# Patient Record
(demographics unavailable — no encounter records)

---

## 2024-10-23 NOTE — HISTORY OF PRESENT ILLNESS
[Formula ___ oz/feed] : [unfilled] oz of formula per feed [Normal] : Normal [In Bassinet/Crib] : sleeps in bassinet/crib [On back] : sleeps on back [Loose bedding, pillow, toys, and/or bumpers in crib] : no loose bedding, pillow, toys, and/or bumpers in crib [de-identified] : kendamil 4oz every 2 hours, one 4 hour stretch overnight [FreeTextEntry8] : stools soft, watery every other day

## 2024-10-23 NOTE — HISTORY OF PRESENT ILLNESS
[Formula ___ oz/feed] : [unfilled] oz of formula per feed [Normal] : Normal [In Bassinet/Crib] : sleeps in bassinet/crib [On back] : sleeps on back [Loose bedding, pillow, toys, and/or bumpers in crib] : no loose bedding, pillow, toys, and/or bumpers in crib [de-identified] : kendamil 4oz every 2 hours, one 4 hour stretch overnight [FreeTextEntry8] : stools soft, watery every other day

## 2024-10-23 NOTE — HISTORY OF PRESENT ILLNESS
[PCV 20] : PCV 20 [DTaP/IPV/Hib/HepB] : DTaP/IPV/Hib/HepB [Rotavirus] : Rotavirus [FreeTextEntry1] : L. thigh: Sharon NOVA thigh: Prevnar 20    Oral: Rotateq     Pt tolerated well.

## 2024-10-23 NOTE — DEVELOPMENTAL MILESTONES
[Smiles responsively] : smiles responsively [Vocalizes with simple cooing] : vocalizes with simple cooing [Lifts head and chest in prone] : lifts head and chest in prone [Opens and shuts hands] : opens and shuts hands [Passed] : passed [Normal Development] : Normal Development [FreeTextEntry1] :  Social Determinants of Health domains were screened and scored.  [FreeTextEntry2] : 2

## 2024-12-20 NOTE — HISTORY OF PRESENT ILLNESS
[de-identified] : cough [FreeTextEntry6] :   has had a mucousy cough for 2 days, noted he seem to "struggle more to breath last night" denies and fever, V/D Brother had a stomach virus last week, no URI Feeding a little less but making regular wet diapers

## 2024-12-20 NOTE — DISCUSSION/SUMMARY
[FreeTextEntry1] :   Jeevan is a beautiful 3 month old infant boy presenting for cough  slight upper airway sounds but lungs clear, no inc wob, no coughing noted during exam  Supportive Care  -nasal saline and aspirator for nose (if age appropriate) or frequent nose blowing -Suction before feedings and bedtime (if age appropriate) -Humidifier -Can sit in steamy bathroom for 10 minutes at a time -ED precautions for resp distress or inc wob, high fevers not responsive to fever reducing medication, lethargy, poor intake/UOP -RTO if new or worsening symptoms

## 2024-12-20 NOTE — PHYSICAL EXAM
[Transmitted Upper Airway Sounds] : transmitted upper airway sounds [NL] : warm, clear [Crackles] : no crackles [Rhonchi] : no rhonchi [Belly Breathing] : no belly breathing [FreeTextEntry1] : alert happy and interactive [FreeTextEntry7] : sat 100%, NO inc wob

## 2025-01-02 NOTE — HISTORY OF PRESENT ILLNESS
[Mother] : mother [Breast milk] : breast milk [Formula ___ oz/feed] : [unfilled] oz of formula per feed [Normal] : Normal [In Bassinet/Crib] : sleeps in bassinet/crib [Co-sleeping] : co-sleeping [Sleeps 12-16 hours per 24 hours (including naps)] : sleeps 12-16 hours per 24 hours (including naps) [Tummy time] : tummy time [Rear facing car seat in back seat] : Rear facing car seat in back seat [de-identified] : mostly formula during the day, breast milk over night. kendamil 4-5oz every 3 hours.   [FreeTextEntry3] : wakes twice overnight for feeds [FreeTextEntry1] : ongoing concerns about feet being turned in.  he is otherwise doing well.

## 2025-01-02 NOTE — PHYSICAL EXAM
[Alert] : alert [Normocephalic] : normocephalic [Flat Open Anterior [place]] : flat open anterior fontanelle [Red Reflex] : red reflex bilateral [PERRL] : PERRL [Normally Placed Ears] : normally placed ears [Auricles Well Formed] : auricles well formed [Clear Tympanic membranes] : clear tympanic membranes [Light reflex present] : light reflex present [Bony landmarks visible] : bony landmarks visible [Nares Patent] : nares patent [Palate Intact] : palate intact [Uvula Midline] : uvula midline [Symmetric Chest Rise] : symmetric chest rise [Clear to Auscultation Bilaterally] : clear to auscultation bilaterally [Regular Rate and Rhythm] : regular rate and rhythm [S1, S2 present] : S1, S2 present [+2 Femoral Pulses] : (+) 2 femoral pulses [Soft] : soft [Bowel Sounds] : bowel sounds present [Central Urethral Opening] : central urethral opening [Testicles Descended] : testicles descended bilaterally [Patent] : patent [Normally Placed] : normally placed [No Abnormal Lymph Nodes Palpated] : no abnormal lymph nodes palpated [Startle Reflex] : startle reflex present [Plantar Grasp] : plantar grasp reflex present [Symmetric Ras] : symmetric ras [Acute Distress] : no acute distress [Discharge] : no discharge [Palpable Masses] : no palpable masses [Murmurs] : no murmurs [Tender] : nontender [Distended] : nondistended [Hepatomegaly] : no hepatomegaly [Splenomegaly] : no splenomegaly [Knight-Ortolani] : negative Knight-Ortolani [Allis Sign] : negative Allis sign [Spinal Dimple] : no spinal dimple [Tuft of Hair] : no tuft of hair [Rash or Lesions] : no rash/lesions [de-identified] : feet cruved inwards, can be easily brought back to center with gentle pressure

## 2025-01-02 NOTE — DISCUSSION/SUMMARY
[FreeTextEntry1] : L. thigh: Pentacel     R. thigh: Prevnar 20     Oral: Rotateq     Pt tolerated well.    [] : The components of the vaccine(s) to be administered today are listed in the plan of care. The disease(s) for which the vaccine(s) are intended to prevent and the risks have been discussed with the caretaker.  The risks are also included in the appropriate vaccination information statements which have been provided to the patient's caregiver.  The caregiver has given consent to vaccinate.

## 2025-01-02 NOTE — DISCUSSION/SUMMARY
[Normal Growth] : growth [Normal Development] : development  [No Elimination Concerns] : elimination [Continue Regimen] : feeding [No Skin Concerns] : skin [Normal Sleep Pattern] : sleep [None] : no medical problems [Anticipatory Guidance Given] : Anticipatory guidance addressed as per the history of present illness section [Family Functioning] : family functioning [Nutritional Adequacy and Growth] : nutritional adequacy and growth [Infant Development] : infant development [Oral Health] : oral health [Safety] : safety [Age Approp Vaccines] : Age appropriate vaccines administered [No Medications] : ~He/She~ is not on any medications [Parent/Guardian] : Parent/Guardian [] : The components of the vaccine(s) to be administered today are listed in the plan of care. The disease(s) for which the vaccine(s) are intended to prevent and the risks have been discussed with the caretaker.  The risks are also included in the appropriate vaccination information statements which have been provided to the patient's caregiver.  The caregiver has given consent to vaccinate. [FreeTextEntry1] : 4 month old growing and developing well will refer to orthopedics for further evaluation of feet   Continue breast feeding or formula feeding with iron-fortified formulations, 2-4 oz every 3-4 hrs. Single fruit of vegetable purees or Cereal may be introduced using a spoon and bowl. Discussed repeating a single food for about 3 days before introducing something else new.  Avoid honey. When in car, patient should be in rear-facing car seat in back seat. Put baby to sleep on back, in own crib with no loose or soft bedding. Lower crib mattress. Help baby to maintain sleep and feeding routines. May offer pacifier if needed. Continue tummy time when awake.   vaccines today: [id number], PCV20, Rotavirus beyfortus declined   Follow up in 2 months for routine care, sooner as needed

## 2025-01-06 NOTE — HISTORY OF PRESENT ILLNESS
[TextBox_4] : JEEVAN is here today for evaluation. He is a 4 month old who was born at term after an unassisted conception and uneventful pregnancy and delivery. Mild hypospadias was detected in the nursery which prevented circumcision as a . No changes to the penis have been noted. No issues making ample wet diapers. No infections. No family history of penile abnormalities.  Has 2 older brothers. One they thought he had a hypospadias, but it sounds like he had a normal circumcision when younger. Jeevan was born at term. no health concerns.  Mom advised to hold of on circumcision due to possible hypospadias.

## 2025-01-06 NOTE — ASSESSMENT
[FreeTextEntry1] : Jeevan's mom is interested in a circumcision. I doubt there is any hypospadias of concern. We may need to do a correction of the penile torsion but that should not add much to the OR time or the recovery  We discussed risks and expected recovery from surgery  Will book on the next day that works for the family I suggest 1 hours for case and can do at [facility].

## 2025-01-06 NOTE — PHYSICAL EXAM
[Well developed] : well developed [Rashes] : no rashes [TextBox_92] : Phimosis with tight skin that does not allow visualization of meatus.  no asymmetry of penile skin No obvious chordee. Slight torsion to his right about 10-15 degrees. bilateral descended testes.

## 2025-01-06 NOTE — REASON FOR VISIT
[Initial Consultation] : an initial consultation [Phimosis] : phimosis [Hypospadias] : hypospadias [Parents] : parents

## 2025-02-26 NOTE — PHYSICAL EXAM
[General Appearance - Alert] : alert [General Appearance - Well-Appearing] : well appearing [General Appearance - In No Acute Distress] : in no acute distress [Appearance Of Head] : the head was normocephalic [Evidence Of Head Injury] : threre was no evidence of injury [Fontanelles Flat] : the anterior fontanelle was soft and flat [Facies] : no facial abnormalities were observed [Sclera] : the conjunctiva were normal [Outer Ear] : the ears and nose were normal in appearance [Examination Of The Oral Cavity] : mucous membranes were moist and pink [Normal Appearance] : was normal in appearance [Neck Supple] : was supple [Respiration, Rhythm And Depth] : normal respiratory rhythm and effort [Auscultation Breath Sounds / Voice Sounds] : clear bilateral breath sounds [Heart Rate And Rhythm] : heart rate and rhythm were normal [Heart Sounds] : normal S1 and S2 [Murmurs] : no murmurs [Bowel Sounds] : normal bowel sounds [Abdomen Soft] : soft [Abdomen Tenderness] : non-tender [Abdominal Distention] : nondistended [] : no hepato-splenomegaly [Atraumatic] : the extremities were atraumatic [FROM Extremities] : there was normal movement of all extremities [Normal Joints] : there was no swelling or deformity of the joints [Normal Motor Tone] : the muscle tone was normal [Involuntary Movements] : no involuntary movements were seen [No Visual Abnormalities] : no visible abnormailities [Motor Tone] : normal tone [Generalized Lymph Node Enlargement] : no lymphadenopathy [Normal] : normal texture and mobility [Scrotum] : the scrotum was normal [Testes Cryptorchism] : both testicles were descended [Testes Mass (___cm)] : there were no testicular masses [Enlarged Diffusely] : was not enlarged [Abnormal Color] : normal color and pigmentation [Skin Lesions 1] : no skin lesions were observed [Skin Turgor Decreased] : normal skin turgor [FreeTextEntry1] : uncircumcised, torsion of penis

## 2025-02-26 NOTE — HISTORY OF PRESENT ILLNESS
[Preoperative Visit] : for a medical evaluation prior to surgery [Good] : Good [Fever] : no fever [Chills] : no chills [Runny Nose] : no runny nose [Earache] : no earache [Headache] : no headache [Sore Throat] : no sore throat [Cough] : no cough [Appetite] : no decrease in appetite [Nausea] : no nausea [Vomiting] : no vomiting [Abdominal Pain] : no abdominal pain [Diarrhea] : no diarrhea [Easy Bruising] : no easy bruising [Rash] : no rash [Dysuria] : no dysuria [Urinary Frequency] : no urinary frequency [Prior Anesthesia] : No prior anesthesia [Prev Anesthesia Reaction] : no previous anesthesia reaction [Diabetes] : no diabetes [Pulmonary Disease] : no pulmonary disease [Renal Disease] : no renal disease [GI Disease] : no gastrointestinal disease [Sleep Apnea] : no sleep apnea [Transfusion Reaction] : no transfusion reaction [Impaired Immunity] : no impaired immunity [Frequent use of NSAIDs] : no use of NSAIDs [Anesthesia Reaction] : no anesthesia reaction [Clotting Disorder] : no clotting disorder [Bleeding Disorder] : no bleeding disorder [Sudden Death] : no sudden death [FreeTextEntry1] : surgical cicumsicion and repair of torsion of penis [FreeTextEntry2] : 3/6/2025 [de-identified] : Dr. Merry Marquis

## 2025-03-06 NOTE — PROCEDURE
[FreeTextEntry1] : Phimosis and Penile torsion [FreeTextEntry2] : Phimosis and Penile torsion [FreeTextEntry3] : Circumcision, correction of penile torsion and local penile block [FreeTextEntry6] : Telfa dressing can be removed at his next diaper change Bacitracin to wound with every diaper change for 2 weeks. Tylenol and Motrin as needed for pain. Avoid straddle and strenuous activities for 4 weeks. Can bath in 48 hours Do picture to secure email for wound follow up in 2-3 weeks.

## 2025-03-19 NOTE — HISTORY OF PRESENT ILLNESS
[Mother] : mother [Fruits] : fruits [Vegetables] : vegetables [In Bassinet/Crib] : sleeps in bassinet/crib [Tummy time] : tummy time [Normal] : Normal [No] : No cigarette smoke exposure [Exposure to electronic nicotine delivery system] : No exposure to electronic nicotine delivery system [Rear facing car seat in back seat] : Rear facing car seat in back seat [Carbon Monoxide Detectors] : Carbon monoxide detectors at home [Smoke Detectors] : Smoke detectors at home. [de-identified] : purees once a day, kendamil 5oz every 3 hours. nurses [de-identified] : wakes every 3 hours overnight.

## 2025-03-19 NOTE — DEVELOPMENTAL MILESTONES
[Pats or smiles at reflection] : pats or smiles at reflection [Begins to turn when name called] : begins to turn when name called [Babbles] : babbles [Rolls over prone to supine] : rolls over prone to supine [Sits briefly without support] : sits briefly without support [Reaches for object and transfers] : reaches for object and transfers [Rakes small object with 4 fingers] : rakes small object with 4 fingers [[place] small object on surface] : bangs small object on surface [Normal Development] : Normal Development [FreeTextEntry1] :  Social Determinants of Health domains were screened and scored.  [Passed] : passed [FreeTextEntry2] : 0

## 2025-03-19 NOTE — DEVELOPMENTAL MILESTONES
[Pats or smiles at reflection] : pats or smiles at reflection [Begins to turn when name called] : begins to turn when name called [Babbles] : babbles [Rolls over prone to supine] : rolls over prone to supine [Sits briefly without support] : sits briefly without support [Reaches for object and transfers] : reaches for object and transfers [Rakes small object with 4 fingers] : rakes small object with 4 fingers [[place] small object on surface] : bangs small object on surface [Normal Development] : Normal Development [FreeTextEntry1] :  Social Determinants of Health domains were screened and scored.  [Passed] : passed [FreeTextEntry2] : 0

## 2025-03-19 NOTE — PHYSICAL EXAM
[Alert] : alert [Normocephalic] : normocephalic [Flat Open Anterior [place]] : flat open anterior fontanelle [Red Reflex] : red reflex bilateral [PERRL] : PERRL [Normally Placed Ears] : normally placed ears [Auricles Well Formed] : auricles well formed [Clear Tympanic membranes] : clear tympanic membranes [Light reflex present] : light reflex present [Bony landmarks visible] : bony landmarks visible [Nares Patent] : nares patent [Palate Intact] : palate intact [Uvula Midline] : uvula midline [Supple, full passive range of motion] : supple, full passive range of motion [Symmetric Chest Rise] : symmetric chest rise [Clear to Auscultation Bilaterally] : clear to auscultation bilaterally [Regular Rate and Rhythm] : regular rate and rhythm [S1, S2 present] : S1, S2 present [+2 Femoral Pulses] : (+) 2 femoral pulses [Soft] : soft [Bowel Sounds] : bowel sounds present [Central Urethral Opening] : central urethral opening [Testicles Descended] : testicles descended bilaterally [Patent] : patent [Normally Placed] : normally placed [No Abnormal Lymph Nodes Palpated] : no abnormal lymph nodes palpated [Symmetric Buttocks Creases] : symmetric buttocks creases [Plantar Grasp] : plantar grasp reflex present [Cranial Nerves Grossly Intact] : cranial nerves grossly intact [Acute Distress] : no acute distress [Discharge] : no discharge [Tooth Eruption] : no tooth eruption [Palpable Masses] : no palpable masses [Murmurs] : no murmurs [Tender] : nontender [Distended] : nondistended [Hepatomegaly] : no hepatomegaly [Splenomegaly] : no splenomegaly [Knight-Ortolani] : negative Knight-Ortolani [Allis Sign] : negative Allis sign [Spinal Dimple] : no spinal dimple [Tuft of Hair] : no tuft of hair [Rash or Lesions] : no rash/lesions [de-identified] : medial deviation of left foot [de-identified] : small superficial inclusion cyst in left axilla

## 2025-03-19 NOTE — DISCUSSION/SUMMARY

## 2025-03-19 NOTE — PHYSICAL EXAM
[Alert] : alert [Normocephalic] : normocephalic [Flat Open Anterior [place]] : flat open anterior fontanelle [Red Reflex] : red reflex bilateral [PERRL] : PERRL [Normally Placed Ears] : normally placed ears [Auricles Well Formed] : auricles well formed [Clear Tympanic membranes] : clear tympanic membranes [Light reflex present] : light reflex present [Bony landmarks visible] : bony landmarks visible [Nares Patent] : nares patent [Palate Intact] : palate intact [Uvula Midline] : uvula midline [Supple, full passive range of motion] : supple, full passive range of motion [Symmetric Chest Rise] : symmetric chest rise [Clear to Auscultation Bilaterally] : clear to auscultation bilaterally [Regular Rate and Rhythm] : regular rate and rhythm [S1, S2 present] : S1, S2 present [+2 Femoral Pulses] : (+) 2 femoral pulses [Soft] : soft [Bowel Sounds] : bowel sounds present [Central Urethral Opening] : central urethral opening [Testicles Descended] : testicles descended bilaterally [Patent] : patent [Normally Placed] : normally placed [No Abnormal Lymph Nodes Palpated] : no abnormal lymph nodes palpated [Symmetric Buttocks Creases] : symmetric buttocks creases [Plantar Grasp] : plantar grasp reflex present [Cranial Nerves Grossly Intact] : cranial nerves grossly intact [Acute Distress] : no acute distress [Discharge] : no discharge [Tooth Eruption] : no tooth eruption [Palpable Masses] : no palpable masses [Murmurs] : no murmurs [Tender] : nontender [Distended] : nondistended [Hepatomegaly] : no hepatomegaly [Splenomegaly] : no splenomegaly [Knight-Ortolani] : negative Knight-Ortolani [Allis Sign] : negative Allis sign [Spinal Dimple] : no spinal dimple [Tuft of Hair] : no tuft of hair [Rash or Lesions] : no rash/lesions [de-identified] : small superficial inclusion cyst in left axilla [de-identified] : medial deviation of left foot

## 2025-03-19 NOTE — HISTORY OF PRESENT ILLNESS
[Mother] : mother [Fruits] : fruits [Vegetables] : vegetables [In Bassinet/Crib] : sleeps in bassinet/crib [Tummy time] : tummy time [Normal] : Normal [No] : No cigarette smoke exposure [Exposure to electronic nicotine delivery system] : No exposure to electronic nicotine delivery system [Rear facing car seat in back seat] : Rear facing car seat in back seat [Carbon Monoxide Detectors] : Carbon monoxide detectors at home [Smoke Detectors] : Smoke detectors at home. [de-identified] : purees once a day, kendamil 5oz every 3 hours. nurses [de-identified] : wakes every 3 hours overnight.

## 2025-03-19 NOTE — DISCUSSION/SUMMARY
[FreeTextEntry1] : AVTAR thigh: Sharon HURST thigh: Prevnar 20  Oral: Rotateq   Pt tolerated well   [] : The components of the vaccine(s) to be administered today are listed in the plan of care. The disease(s) for which the vaccine(s) are intended to prevent and the risks have been discussed with the caretaker.  The risks are also included in the appropriate vaccination information statements which have been provided to the patient's caregiver.  The caregiver has given consent to vaccinate.

## 2025-04-03 NOTE — END OF VISIT
[FreeTextEntry3] : I, Adarsh Burnette MD, I personally performed the services described in the documentation, reviewed the documentation recorded by the scribe in my presence and it accurately and completely records my words and actions

## 2025-04-03 NOTE — REASON FOR VISIT
[Initial Evaluation] : an initial evaluation [Patient] : patient [Mother] : mother [FreeTextEntry1] : intoeing

## 2025-04-03 NOTE — HISTORY OF PRESENT ILLNESS
[FreeTextEntry1] : Jeevan is a 7-month-old male born full term via  presents with his mother for evaluation of intoeing. Mother notes that his pediatrician was concerned about right foot turning inwards. He was referred here for orthopedic evaluation. He is otherwise doing well and meeting his developmental milestones.  Here for orthopedic evaluation and management.

## 2025-04-03 NOTE — ASSESSMENT
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 Mother was reassured that In-toeing is a common developmental variation related to intrauterine positioning.In-toeing resolves spontaneously as the child grows.Even if in-toeing does not completely resolve, long-term functional problems are rare. Discussed at length with parents that interventions such as special shoes, orthotics are now ineffective. We will continue to monitor him with follow up in 12 months for repeat clinical assessment. All questions answered. Family and patient verbalize understanding of the plan.   Rosa Maria SOTO PA-C have acted as scribe and documented the above for Dr. Burnette

## 2025-04-03 NOTE — PHYSICAL EXAM
[FreeTextEntry1] : Gait: Presents ambulating independently without signs of antalgia.  Good coordination and balance noted. GENERAL: alert, cooperative, in NAD SKIN: The skin is intact, warm, pink and dry over the area examined. EYES: Normal conjunctiva, normal eyelids and pupils were equal and round. ENT: normal ears, normal nose and normal lips. CARDIOVASCULAR: brisk capillary refill, but no peripheral edema. RESPIRATORY: The patient is in no apparent respiratory distress. They're taking full deep breaths without use of accessory muscles or evidence of audible wheezes or stridor without the use of a stethoscope. Normal respiratory effort. ABDOMEN: not examined  focused exam LE  No obvious clinical orthopedic deformities. No clinical leg length discrepancies. No swelling, deformities or bruises of the lower extremities Full flexion and extension of the hips, abduction with the hips in flexion is 60 bilaterally. Internal rotation of the hips 60 on the right, 60 on the left, external rotation of the hips and 50 on the right, 50 on the left.Thigh-foot angles approximately -10 bilaterally. Both patellas are properly located. Full flexion and extension of the knees, no locking. Meniscal maneuvers are negative. Both feet are well aligned, they're flexible, no calluses. No signs of metatarsus adductus. No cavus. No toe deformities.

## 2025-05-12 NOTE — HISTORY OF PRESENT ILLNESS
[de-identified] : putting fingers in ears [FreeTextEntry6] :  ear tugging or fingers in ears Denies, cough, RN or fever

## 2025-05-12 NOTE — REVIEW OF SYSTEMS
[Fever] : no fever [Ear Tugging] : ear tugging [Nasal Discharge] : no nasal discharge [Cough] : no cough [Negative] : Genitourinary

## 2025-05-12 NOTE — DISCUSSION/SUMMARY
[FreeTextEntry1] :   Teething Syndrome Chew toys Plastic and rubber toys are great for soothing aching gums. Avoid: Teething necklaces and bracelets that may be a choking/strangulation hazard    Cold things For help numbing and easing the ache and inflammation, try using damp washcloths that have been twisted and frozen (tie one end in a knot for better gnawing). Avoid teething rings that are frozen solid; they are too hard for children's mouths.  Massage A light, gentle rub or massage might give your little one a lot of relief. Remember to wash your hands, then massage the sore areas in your baby's mouth with your finger or knuckle. Medication Pain relievers intended to be rubbed on a baby's gums aren't very helpful; a teething baby drools so much that the medication is quickly washed away. In addition, such medications can numb the back of the throat and interfere with your babys ability to swallow. If your baby is clearly uncomfortable, may give  dose of acetaminophen (Tylenol).   Fever, vomiting, diarrhea When your babys teeth are coming through, she may also have a very slight increase in temperature. But if her temperature reaches 100.4F (38C) or above, its not because of teething. If your baby has symptoms such as fever, vomiting, or diarrhea while teething, call office.  Keep Usual Bedtime Routine: If your teething baby is irritable, try to make her comfortable, but keep to your usual bedtime routine. Changing the routine, even for a few nights, may only lead to sleep troubles.

## 2025-05-12 NOTE — PHYSICAL EXAM
[NL] : warm, clear [[email or web address]] : Left ear partially blocked with cerumen, but portion visualized looks WNL